# Patient Record
Sex: FEMALE | Race: WHITE | Employment: OTHER | URBAN - METROPOLITAN AREA
[De-identification: names, ages, dates, MRNs, and addresses within clinical notes are randomized per-mention and may not be internally consistent; named-entity substitution may affect disease eponyms.]

---

## 2021-12-09 PROBLEM — I77.9 PERIPHERAL ARTERIAL OCCLUSIVE DISEASE (HCC): Status: ACTIVE | Noted: 2021-12-09

## 2021-12-09 PROBLEM — H26.9 CATARACT, UNSPECIFIED CATARACT TYPE, UNSPECIFIED LATERALITY: Status: ACTIVE | Noted: 2021-12-09

## 2021-12-09 PROBLEM — I10 ESSENTIAL HYPERTENSION: Status: ACTIVE | Noted: 2021-12-09

## 2021-12-16 PROBLEM — E46 PROTEIN-CALORIE MALNUTRITION, UNSPECIFIED SEVERITY (HCC): Status: RESOLVED | Noted: 2021-12-16 | Resolved: 2021-12-16

## 2021-12-16 PROBLEM — E46 PROTEIN-CALORIE MALNUTRITION, UNSPECIFIED SEVERITY (HCC): Status: ACTIVE | Noted: 2021-12-16

## 2022-11-23 ENCOUNTER — APPOINTMENT (OUTPATIENT)
Dept: ULTRASOUND IMAGING | Facility: HOSPITAL | Age: 82
End: 2022-11-23
Attending: EMERGENCY MEDICINE
Payer: MEDICARE

## 2022-11-23 ENCOUNTER — HOSPITAL ENCOUNTER (INPATIENT)
Facility: HOSPITAL | Age: 82
LOS: 2 days | Discharge: HOME | End: 2022-11-25
Attending: EMERGENCY MEDICINE | Admitting: INTERNAL MEDICINE
Payer: MEDICARE

## 2022-11-23 ENCOUNTER — APPOINTMENT (OUTPATIENT)
Dept: CT IMAGING | Facility: HOSPITAL | Age: 82
End: 2022-11-23
Attending: EMERGENCY MEDICINE
Payer: MEDICARE

## 2022-11-23 ENCOUNTER — HOSPITAL ENCOUNTER (OUTPATIENT)
Facility: HOSPITAL | Age: 82
Setting detail: OBSERVATION
Discharge: HOME | End: 2022-11-25
Attending: EMERGENCY MEDICINE | Admitting: INTERNAL MEDICINE
Payer: MEDICARE

## 2022-11-23 ENCOUNTER — APPOINTMENT (OUTPATIENT)
Dept: GENERAL RADIOLOGY | Facility: HOSPITAL | Age: 82
End: 2022-11-23
Attending: RADIOLOGY
Payer: MEDICARE

## 2022-11-23 DIAGNOSIS — J90 PLEURAL EFFUSION: ICD-10-CM

## 2022-11-23 DIAGNOSIS — R06.00 DYSPNEA, UNSPECIFIED TYPE: Primary | ICD-10-CM

## 2022-11-23 DIAGNOSIS — C79.9 METASTATIC ADENOCARCINOMA (HCC): ICD-10-CM

## 2022-11-23 DIAGNOSIS — R22.2 CHEST MASS: ICD-10-CM

## 2022-11-23 PROBLEM — R79.89 AZOTEMIA: Status: ACTIVE | Noted: 2022-11-23

## 2022-11-23 PROBLEM — R73.9 HYPERGLYCEMIA: Status: ACTIVE | Noted: 2022-11-23

## 2022-11-23 LAB
ALBUMIN SERPL-MCNC: 3.1 G/DL (ref 3.4–5)
ALBUMIN/GLOB SERPL: 0.9 {RATIO} (ref 1–2)
ALP LIVER SERPL-CCNC: 99 U/L
ALT SERPL-CCNC: 27 U/L
ANION GAP SERPL CALC-SCNC: 6 MMOL/L (ref 0–18)
AST SERPL-CCNC: 25 U/L (ref 15–37)
ATRIAL RATE: 98 BPM
BASOPHILS # BLD AUTO: 0.09 X10(3) UL (ref 0–0.2)
BASOPHILS NFR BLD AUTO: 0.6 %
BASOPHILS NFR PLR: 0 %
BILIRUB SERPL-MCNC: 0.5 MG/DL (ref 0.1–2)
BUN BLD-MCNC: 27 MG/DL (ref 7–18)
CALCIUM BLD-MCNC: 8.9 MG/DL (ref 8.5–10.1)
CHLORIDE SERPL-SCNC: 104 MMOL/L (ref 98–112)
CO2 SERPL-SCNC: 27 MMOL/L (ref 21–32)
COLOR FLD: YELLOW
CREAT BLD-MCNC: 1 MG/DL
EOSINOPHIL # BLD AUTO: 0.05 X10(3) UL (ref 0–0.7)
EOSINOPHIL NFR BLD AUTO: 0.4 %
EOSINOPHIL NFR PLR: 0 %
ERYTHROCYTE [DISTWIDTH] IN BLOOD BY AUTOMATED COUNT: 15 %
GFR SERPLBLD BASED ON 1.73 SQ M-ARVRAT: 57 ML/MIN/1.73M2 (ref 60–?)
GLOBULIN PLAS-MCNC: 3.4 G/DL (ref 2.8–4.4)
GLUCOSE BLD-MCNC: 150 MG/DL (ref 70–99)
GLUCOSE PLR-MCNC: 38 MG/DL
HCT VFR BLD AUTO: 44.5 %
HGB BLD-MCNC: 14.7 G/DL
IMM GRANULOCYTES # BLD AUTO: 0.09 X10(3) UL (ref 0–1)
IMM GRANULOCYTES NFR BLD: 0.6 %
INR BLD: 1.11 (ref 0.85–1.16)
LDH FLD L TO P-CCNC: 720 U/L
LYMPHOCYTES # BLD AUTO: 0.95 X10(3) UL (ref 1–4)
LYMPHOCYTES NFR BLD AUTO: 6.8 %
LYMPHOCYTES NFR PLR: 54 %
MCH RBC QN AUTO: 28.1 PG (ref 26–34)
MCHC RBC AUTO-ENTMCNC: 33 G/DL (ref 31–37)
MCV RBC AUTO: 85.1 FL
MESOTHL CELL NFR PLR: 4 %
MONOCYTES # BLD AUTO: 1.11 X10(3) UL (ref 0.1–1)
MONOCYTES NFR BLD AUTO: 8 %
MONOS+MACROS NFR PLR: 31 %
NEUTROPHILS # BLD AUTO: 11.59 X10 (3) UL (ref 1.5–7.7)
NEUTROPHILS # BLD AUTO: 11.59 X10(3) UL (ref 1.5–7.7)
NEUTROPHILS NFR BLD AUTO: 83.6 %
NEUTROPHILS NFR PLR: 11 %
OSMOLALITY SERPL CALC.SUM OF ELEC: 292 MOSM/KG (ref 275–295)
P AXIS: 61 DEGREES
P-R INTERVAL: 168 MS
PLATELET # BLD AUTO: 304 10(3)UL (ref 150–450)
POTASSIUM SERPL-SCNC: 3.6 MMOL/L (ref 3.5–5.1)
PROT PLR-MCNC: 3.7 G/DL
PROT SERPL-MCNC: 6.5 G/DL (ref 6.4–8.2)
PROTHROMBIN TIME: 14.3 SECONDS (ref 11.6–14.8)
Q-T INTERVAL: 322 MS
QRS DURATION: 66 MS
QTC CALCULATION (BEZET): 411 MS
R AXIS: 82 DEGREES
RBC # BLD AUTO: 5.23 X10(6)UL
RBC PLEURAL FLUID: <3000 /MM3
SARS-COV-2 RNA RESP QL NAA+PROBE: NOT DETECTED
SODIUM SERPL-SCNC: 137 MMOL/L (ref 136–145)
T AXIS: 81 DEGREES
TOTAL CELLS COUNTED FLD: 100
TROPONIN I HIGH SENSITIVITY: 13 NG/L
VENTRICULAR RATE: 98 BPM
WBC # BLD AUTO: 13.9 X10(3) UL (ref 4–11)
WBC # PLR: 261 /MM3

## 2022-11-23 PROCEDURE — 81275 KRAS GENE VARIANTS EXON 2: CPT | Performed by: INTERNAL MEDICINE

## 2022-11-23 PROCEDURE — 88381 MICRODISSECTION MANUAL: CPT | Performed by: INTERNAL MEDICINE

## 2022-11-23 PROCEDURE — 88108 CYTOPATH CONCENTRATE TECH: CPT | Performed by: INTERNAL MEDICINE

## 2022-11-23 PROCEDURE — 71275 CT ANGIOGRAPHY CHEST: CPT | Performed by: EMERGENCY MEDICINE

## 2022-11-23 PROCEDURE — 99285 EMERGENCY DEPT VISIT HI MDM: CPT

## 2022-11-23 PROCEDURE — 81210 BRAF GENE: CPT | Performed by: INTERNAL MEDICINE

## 2022-11-23 PROCEDURE — 71045 X-RAY EXAM CHEST 1 VIEW: CPT | Performed by: RADIOLOGY

## 2022-11-23 PROCEDURE — 36415 COLL VENOUS BLD VENIPUNCTURE: CPT

## 2022-11-23 PROCEDURE — 87070 CULTURE OTHR SPECIMN AEROBIC: CPT | Performed by: INTERNAL MEDICINE

## 2022-11-23 PROCEDURE — 89050 BODY FLUID CELL COUNT: CPT | Performed by: INTERNAL MEDICINE

## 2022-11-23 PROCEDURE — 84484 ASSAY OF TROPONIN QUANT: CPT | Performed by: EMERGENCY MEDICINE

## 2022-11-23 PROCEDURE — 84484 ASSAY OF TROPONIN QUANT: CPT

## 2022-11-23 PROCEDURE — 87205 SMEAR GRAM STAIN: CPT | Performed by: INTERNAL MEDICINE

## 2022-11-23 PROCEDURE — 88342 IMHCHEM/IMCYTCHM 1ST ANTB: CPT | Performed by: INTERNAL MEDICINE

## 2022-11-23 PROCEDURE — 85610 PROTHROMBIN TIME: CPT | Performed by: INTERNAL MEDICINE

## 2022-11-23 PROCEDURE — 80053 COMPREHEN METABOLIC PANEL: CPT

## 2022-11-23 PROCEDURE — 32555 ASPIRATE PLEURA W/ IMAGING: CPT | Performed by: EMERGENCY MEDICINE

## 2022-11-23 PROCEDURE — 83615 LACTATE (LD) (LDH) ENZYME: CPT | Performed by: INTERNAL MEDICINE

## 2022-11-23 PROCEDURE — 81276 KRAS GENE ADDL VARIANTS: CPT | Performed by: INTERNAL MEDICINE

## 2022-11-23 PROCEDURE — 85025 COMPLETE CBC W/AUTO DIFF WBC: CPT

## 2022-11-23 PROCEDURE — 93010 ELECTROCARDIOGRAM REPORT: CPT

## 2022-11-23 PROCEDURE — 85025 COMPLETE CBC W/AUTO DIFF WBC: CPT | Performed by: EMERGENCY MEDICINE

## 2022-11-23 PROCEDURE — 89051 BODY FLUID CELL COUNT: CPT | Performed by: INTERNAL MEDICINE

## 2022-11-23 PROCEDURE — 88305 TISSUE EXAM BY PATHOLOGIST: CPT | Performed by: INTERNAL MEDICINE

## 2022-11-23 PROCEDURE — 0W9B3ZZ DRAINAGE OF LEFT PLEURAL CAVITY, PERCUTANEOUS APPROACH: ICD-10-PCS | Performed by: RADIOLOGY

## 2022-11-23 PROCEDURE — 88360 TUMOR IMMUNOHISTOCHEM/MANUAL: CPT | Performed by: INTERNAL MEDICINE

## 2022-11-23 PROCEDURE — 81235 EGFR GENE COM VARIANTS: CPT | Performed by: INTERNAL MEDICINE

## 2022-11-23 PROCEDURE — 80053 COMPREHEN METABOLIC PANEL: CPT | Performed by: EMERGENCY MEDICINE

## 2022-11-23 PROCEDURE — 84132 ASSAY OF SERUM POTASSIUM: CPT | Performed by: HOSPITALIST

## 2022-11-23 PROCEDURE — 82945 GLUCOSE OTHER FLUID: CPT | Performed by: INTERNAL MEDICINE

## 2022-11-23 PROCEDURE — 88366 INSITU HYBRIDIZATION (FISH): CPT | Performed by: INTERNAL MEDICINE

## 2022-11-23 PROCEDURE — 88341 IMHCHEM/IMCYTCHM EA ADD ANTB: CPT | Performed by: INTERNAL MEDICINE

## 2022-11-23 PROCEDURE — 93005 ELECTROCARDIOGRAM TRACING: CPT

## 2022-11-23 PROCEDURE — 84157 ASSAY OF PROTEIN OTHER: CPT | Performed by: INTERNAL MEDICINE

## 2022-11-23 RX ORDER — POTASSIUM CHLORIDE 20 MEQ/1
40 TABLET, EXTENDED RELEASE ORAL EVERY 4 HOURS
Status: COMPLETED | OUTPATIENT
Start: 2022-11-23 | End: 2022-11-23

## 2022-11-23 RX ORDER — ACETAMINOPHEN 500 MG
500 TABLET ORAL EVERY 4 HOURS PRN
Status: DISCONTINUED | OUTPATIENT
Start: 2022-11-23 | End: 2022-11-25

## 2022-11-23 RX ORDER — IOHEXOL 350 MG/ML
100 INJECTION, SOLUTION INTRAVENOUS
Status: COMPLETED | OUTPATIENT
Start: 2022-11-23 | End: 2022-11-23

## 2022-11-23 RX ORDER — HEPARIN SODIUM 5000 [USP'U]/ML
5000 INJECTION, SOLUTION INTRAVENOUS; SUBCUTANEOUS EVERY 8 HOURS SCHEDULED
Status: DISCONTINUED | OUTPATIENT
Start: 2022-11-24 | End: 2022-11-25

## 2022-11-23 RX ORDER — DOCUSATE SODIUM 100 MG/1
100 CAPSULE, LIQUID FILLED ORAL 2 TIMES DAILY
Status: DISCONTINUED | OUTPATIENT
Start: 2022-11-23 | End: 2022-11-25

## 2022-11-23 NOTE — ED QUICK NOTES
Orders for admission, patient is aware of plan and ready to go upstairs. Any questions, please call ED RN Lukasz at extension [phone].      Patient Covid vaccination status: Fully vaccinated     COVID Test Ordered in ED: Rapid SARS-CoV-2 by PCR    COVID Suspicion at Admission: Low clinical suspicion for COVID    Running Infusions:  None    Mental Status/LOC at time of transport: A&O x3    Other pertinent information:   CIWA score: N/A   NIH score:  N/A

## 2022-11-23 NOTE — IMAGING NOTE
Pt post thoracentesis, tolerated procedure well. VSS. Pt verbalizes comfort. Pt with clean, dry and intact, small less than nickel size blood noted to dressing to R mid back, no subQ emphysema noted. SBAR given to inpatient RN at [phone]. Post procedure portable cxr done and pt to room 507 with US staff.

## 2022-11-23 NOTE — ED INITIAL ASSESSMENT (HPI)
Pt states she was seen by PCP for SOB and fatigue. Pt had xray done and found mass on lung and fluid. Pt was scheduled to have CT Friday but was told by PCP to come here. Pt denies fevers.
weight-bearing as tolerated

## 2022-11-23 NOTE — PROCEDURES
[facility]  Procedure Note    Miah Steiner Patient Status:  Inpatient    1940 MRN [id number]   [facility] 5NW-A Attending Prabhjot Deng DO   Hosp Day # 0 PCP Ovidio Roger DO     Procedure: US guided left thoracentesis    Pre-Procedure Diagnosis:  Left pleural effusion    Post-Procedure Diagnosis: same    Anesthesia:  Local    Findings:  Serous fluid    Specimens: 1300 ml    Blood Loss:  <5 ml    Tourniquet Time: n/a  Complications:  None  Drains:  removed    Secondary Diagnosis:  none    Ina Ramirez MD  2022

## 2022-11-24 LAB
ALBUMIN SERPL-MCNC: 2.8 G/DL (ref 3.4–5)
ALBUMIN/GLOB SERPL: 1 {RATIO} (ref 1–2)
ALP LIVER SERPL-CCNC: 95 U/L
ALT SERPL-CCNC: 23 U/L
ANION GAP SERPL CALC-SCNC: 4 MMOL/L (ref 0–18)
AST SERPL-CCNC: 20 U/L (ref 15–37)
BILIRUB SERPL-MCNC: 0.5 MG/DL (ref 0.1–2)
BUN BLD-MCNC: 23 MG/DL (ref 7–18)
CALCIUM BLD-MCNC: 8.8 MG/DL (ref 8.5–10.1)
CHLORIDE SERPL-SCNC: 109 MMOL/L (ref 98–112)
CO2 SERPL-SCNC: 28 MMOL/L (ref 21–32)
CREAT BLD-MCNC: 0.67 MG/DL
ERYTHROCYTE [DISTWIDTH] IN BLOOD BY AUTOMATED COUNT: 15.2 %
GFR SERPLBLD BASED ON 1.73 SQ M-ARVRAT: 88 ML/MIN/1.73M2 (ref 60–?)
GLOBULIN PLAS-MCNC: 2.7 G/DL (ref 2.8–4.4)
GLUCOSE BLD-MCNC: 92 MG/DL (ref 70–99)
HCT VFR BLD AUTO: 44.1 %
HGB BLD-MCNC: 14.3 G/DL
MAGNESIUM SERPL-MCNC: 2.4 MG/DL (ref 1.6–2.6)
MCH RBC QN AUTO: 27.6 PG (ref 26–34)
MCHC RBC AUTO-ENTMCNC: 32.4 G/DL (ref 31–37)
MCV RBC AUTO: 85.1 FL
OSMOLALITY SERPL CALC.SUM OF ELEC: 295 MOSM/KG (ref 275–295)
PLATELET # BLD AUTO: 298 10(3)UL (ref 150–450)
POTASSIUM SERPL-SCNC: 5 MMOL/L (ref 3.5–5.1)
POTASSIUM SERPL-SCNC: 5.3 MMOL/L (ref 3.5–5.1)
POTASSIUM SERPL-SCNC: 5.3 MMOL/L (ref 3.5–5.1)
PROT SERPL-MCNC: 5.5 G/DL (ref 6.4–8.2)
RBC # BLD AUTO: 5.18 X10(6)UL
SODIUM SERPL-SCNC: 141 MMOL/L (ref 136–145)
WBC # BLD AUTO: 12.1 X10(3) UL (ref 4–11)

## 2022-11-24 PROCEDURE — 80053 COMPREHEN METABOLIC PANEL: CPT | Performed by: HOSPITALIST

## 2022-11-24 PROCEDURE — 93010 ELECTROCARDIOGRAM REPORT: CPT | Performed by: INTERNAL MEDICINE

## 2022-11-24 PROCEDURE — 84132 ASSAY OF SERUM POTASSIUM: CPT | Performed by: HOSPITALIST

## 2022-11-24 PROCEDURE — 83735 ASSAY OF MAGNESIUM: CPT | Performed by: HOSPITALIST

## 2022-11-24 PROCEDURE — 85027 COMPLETE CBC AUTOMATED: CPT | Performed by: HOSPITALIST

## 2022-11-24 RX ORDER — POLYETHYLENE GLYCOL 3350 17 G/17G
17 POWDER, FOR SOLUTION ORAL DAILY
Status: DISCONTINUED | OUTPATIENT
Start: 2022-11-24 | End: 2022-11-25

## 2022-11-24 NOTE — PLAN OF CARE
Problem: Left pleural effusion   Data: Ax04. Telemetry-sinus rhythm. , room air, 02 sats 92%<. Afebrile. Diminished breathe sounds. Continent, constipated colace given. Saline lock. Up with 1 assist walker. Regular diet. Thoracentesis site clean and dry. Potassium 5.3 , MD clifford, EKG drawn. NSR- HR 74.  Repeat K+  Intervention: Potassium, heparin   Education: Medications, call light   Response: Cooperative with care     Problem: Patient/Family Goals  Goal: Patient/Family Long Term Goal  Description: Patient's Long Term Goal: Discharge to home     Interventions:  - Follow plan of care  - See additional Care Plan goals for specific interventions  Outcome: Progressing  Goal: Patient/Family Short Term Goal  Description: Patient's Short Term Goal:   11/23 noc: Have BM     Interventions:   - Stool softener  - Consults   - See additional Care Plan goals for specific interventions  Outcome: Not Progressing     Problem: RESPIRATORY - ADULT  Goal: Achieves optimal ventilation and oxygenation  Description: INTERVENTIONS:  - Assess for changes in respiratory status  - Assess for changes in mentation and behavior  - Position to facilitate oxygenation and minimize respiratory effort  - Oxygen supplementation based on oxygen saturation or ABGs  - Provide Smoking Cessation handout, if applicable  - Encourage broncho-pulmonary hygiene including cough, deep breathe, Incentive Spirometry  - Assess the need for suctioning and perform as needed  - Assess and instruct to report SOB or any respiratory difficulty  - Respiratory Therapy support as indicated  - Manage/alleviate anxiety  - Monitor for signs/symptoms of CO2 retention  Outcome: Progressing     Problem: Patient/Family Goals  Goal: Patient/Family Long Term Goal  Description: Patient's Long Term Goal: Discharge to home     Interventions:  - Follow plan of care  - See additional Care Plan goals for specific interventions  Outcome: Progressing  Goal: Patient/Family Short Term Goal  Description: Patient's Short Term Goal:   11/23 noc: Have BM     Interventions:   - Stool softener  - Consults   - See additional Care Plan goals for specific interventions  Outcome: Not Progressing

## 2022-11-24 NOTE — PLAN OF CARE
Patient is A&Ox4. Vital signs wnl, afebrile. Weaned to RA, maintaining sats >92%. Saturations dropped to 88% the lowest during ambulation on RA, sats recovered immediately. Per patient, feeling more SOB and breathing is labored after ambulation. Hospitalist notified. Per patient, having productive cough. Tele, NSR. No pain, n/v/d. Voids. High fall risk, up with standby. Fall precautions in place. Heparin subq for VTE prophylaxis. Encouraged to ambulate in the hallway and use IS hourly. Patient updated on plan of care, needs and concerns addressed.     Problem: Patient/Family Goals  Goal: Patient/Family Long Term Goal  Description: Patient's Long Term Goal: Discharge to home     Interventions:  - Follow plan of care  - See additional Care Plan goals for specific interventions  Outcome: Progressing  Goal: Patient/Family Short Term Goal  Description: Patient's Short Term Goal:   11/23 noc: Have BM  11/24 am: have a bm     Interventions:   - Stool softener  - Consults   - See additional Care Plan goals for specific interventions  Outcome: Progressing     Problem: RESPIRATORY - ADULT  Goal: Achieves optimal ventilation and oxygenation  Description: INTERVENTIONS:  - Assess for changes in respiratory status  - Assess for changes in mentation and behavior  - Position to facilitate oxygenation and minimize respiratory effort  - Oxygen supplementation based on oxygen saturation or ABGs  - Provide Smoking Cessation handout, if applicable  - Encourage broncho-pulmonary hygiene including cough, deep breathe, Incentive Spirometry  - Assess the need for suctioning and perform as needed  - Assess and instruct to report SOB or any respiratory difficulty  - Respiratory Therapy support as indicated  - Manage/alleviate anxiety  - Monitor for signs/symptoms of CO2 retention  Outcome: Progressing

## 2022-11-25 VITALS
DIASTOLIC BLOOD PRESSURE: 82 MMHG | BODY MASS INDEX: 12.86 KG/M2 | SYSTOLIC BLOOD PRESSURE: 141 MMHG | WEIGHT: 80 LBS | TEMPERATURE: 98 F | RESPIRATION RATE: 22 BRPM | OXYGEN SATURATION: 95 % | HEART RATE: 104 BPM | HEIGHT: 66 IN

## 2022-11-25 LAB
ANION GAP SERPL CALC-SCNC: 5 MMOL/L (ref 0–18)
ATRIAL RATE: 104 BPM
ATRIAL RATE: 74 BPM
BUN BLD-MCNC: 19 MG/DL (ref 7–18)
CALCIUM BLD-MCNC: 9.1 MG/DL (ref 8.5–10.1)
CHLORIDE SERPL-SCNC: 107 MMOL/L (ref 98–112)
CO2 SERPL-SCNC: 27 MMOL/L (ref 21–32)
CREAT BLD-MCNC: 0.64 MG/DL
ERYTHROCYTE [DISTWIDTH] IN BLOOD BY AUTOMATED COUNT: 15.2 %
GFR SERPLBLD BASED ON 1.73 SQ M-ARVRAT: 89 ML/MIN/1.73M2 (ref 60–?)
GLUCOSE BLD-MCNC: 107 MG/DL (ref 70–99)
HCT VFR BLD AUTO: 45.5 %
HGB BLD-MCNC: 15.1 G/DL
MCH RBC QN AUTO: 28.3 PG (ref 26–34)
MCHC RBC AUTO-ENTMCNC: 33.2 G/DL (ref 31–37)
MCV RBC AUTO: 85.2 FL
OSMOLALITY SERPL CALC.SUM OF ELEC: 291 MOSM/KG (ref 275–295)
P AXIS: 46 DEGREES
P AXIS: 67 DEGREES
P-R INTERVAL: 150 MS
P-R INTERVAL: 156 MS
PLATELET # BLD AUTO: 329 10(3)UL (ref 150–450)
POTASSIUM SERPL-SCNC: 4.2 MMOL/L (ref 3.5–5.1)
Q-T INTERVAL: 356 MS
Q-T INTERVAL: 408 MS
QRS DURATION: 84 MS
QRS DURATION: 86 MS
QTC CALCULATION (BEZET): 452 MS
QTC CALCULATION (BEZET): 468 MS
R AXIS: 85 DEGREES
R AXIS: 87 DEGREES
RBC # BLD AUTO: 5.34 X10(6)UL
SODIUM SERPL-SCNC: 139 MMOL/L (ref 136–145)
T AXIS: 58 DEGREES
T AXIS: 86 DEGREES
T4 FREE SERPL-MCNC: 1.6 NG/DL (ref 0.8–1.7)
TSI SER-ACNC: 1.12 MIU/ML (ref 0.36–3.74)
VENTRICULAR RATE: 104 BPM
VENTRICULAR RATE: 74 BPM
WBC # BLD AUTO: 10.1 X10(3) UL (ref 4–11)

## 2022-11-25 PROCEDURE — 93005 ELECTROCARDIOGRAM TRACING: CPT

## 2022-11-25 PROCEDURE — 84439 ASSAY OF FREE THYROXINE: CPT | Performed by: HOSPITALIST

## 2022-11-25 PROCEDURE — 93010 ELECTROCARDIOGRAM REPORT: CPT | Performed by: INTERNAL MEDICINE

## 2022-11-25 PROCEDURE — 85027 COMPLETE CBC AUTOMATED: CPT | Performed by: HOSPITALIST

## 2022-11-25 PROCEDURE — 80048 BASIC METABOLIC PNL TOTAL CA: CPT | Performed by: HOSPITALIST

## 2022-11-25 PROCEDURE — 84443 ASSAY THYROID STIM HORMONE: CPT | Performed by: HOSPITALIST

## 2022-11-25 RX ORDER — HYDRALAZINE HYDROCHLORIDE 20 MG/ML
10 INJECTION INTRAMUSCULAR; INTRAVENOUS EVERY 6 HOURS PRN
Status: DISCONTINUED | OUTPATIENT
Start: 2022-11-25 | End: 2022-11-25

## 2022-11-25 NOTE — PROGRESS NOTES
11/25/22 0525   Provider Notification   Reason for Communication Evaluate  (elevated BP)   Provider Name Other (comment)  (Rad Hallman)   Method of Communication Page   Response Waiting for response   Notification Time [phone]     FYI, pt /92 this am. Pt has hx of HTN but does not take anything daily for it. Pt BP was elevated during the day yesterday but went down on its own. This am SBP went from 160s @ midnight to SBP in 170s at 0500. Per MD, hydralazine 10mg ordered PRN q6h as needed. WCTM.

## 2022-11-25 NOTE — PLAN OF CARE
Pt A&Ox4. Forgetful at times. Glasses at bedside. Room air, . C/o of sob with activity. Tele, NSR/ST with activity. Heparin. Voids, up SBA. C/o of constipation, scheduled/prn stool softeners given. No c/o of pain, n/v/d. Regular diet, tolerating well. Pt updated on poc. No further needs at this time. Safety/fall precautions in place. TM. 0550: Pt ambulated to BR with this RN. Pt HR jumped to 130s while in BR. Pt c/o of palpitations and sob just like she experienced yesterday. HR dropped down to 90s-low 100s after laying back in bed & palpitations/sob resolved. WCTM.     Problem: Patient/Family Goals  Goal: Patient/Family Long Term Goal  Description: Patient's Long Term Goal: Discharge to home     Interventions:  - Follow plan of care  - See additional Care Plan goals for specific interventions  Outcome: Progressing  Goal: Patient/Family Short Term Goal  Description: Patient's Short Term Goal:   11/23 noc: Have BM  11/24 am: have a bm   11/24 NOC: have BM, relieve sob with ambulation    Interventions:   - Stool softener  - Consults   - See additional Care Plan goals for specific interventions  Outcome: Progressing     Problem: RESPIRATORY - ADULT  Goal: Achieves optimal ventilation and oxygenation  Description: INTERVENTIONS:  - Assess for changes in respiratory status  - Assess for changes in mentation and behavior  - Position to facilitate oxygenation and minimize respiratory effort  - Oxygen supplementation based on oxygen saturation or ABGs  - Provide Smoking Cessation handout, if applicable  - Encourage broncho-pulmonary hygiene including cough, deep breathe, Incentive Spirometry  - Assess the need for suctioning and perform as needed  - Assess and instruct to report SOB or any respiratory difficulty  - Respiratory Therapy support as indicated  - Manage/alleviate anxiety  - Monitor for signs/symptoms of CO2 retention  Outcome: Progressing

## 2022-11-25 NOTE — PROGRESS NOTES
NURSING DISCHARGE NOTE    Discharged Home via Wheelchair. Accompanied by Support staff  Belongings Taken by patient/family. PIV removed, discharge education completed with son at bedside. No questions on POC at this time.

## 2022-11-25 NOTE — CDS QUERY
Kareen Malagon     Dear Dr. Marland Hamman,   Clinical information (provided below) indicates a diagnosis of ACUTE RESPIRATORY FAILURE. For accurate ICD-10-CM code assignment to reflect severity of illness and risk of mortality,   BASED ON YOUR CLINICAL JUDGMENT, PLEASE CLARIFY  THE DOCUMENTED DIAGNOSIS OF: ACUTE RESPIRATORY FAILURE    [   ] Acute hypoxic respiratory failure is ruled out after study    [ x  ] Acute hypoxic respiratory failure is ruled in (please document any additional clinical indicators supportive of your diagnosis) hypoxic and requing oxygen support due to pleural effusion. [   ] Other (please specify): _________________________    [  ] Unable to determine    ________________________________________________________________________________     Documentation from the Medical Record:   Risk: mass to L lung found on xray and pleural effusion. Hx smoker   Clinical indicators:H&P- SOB for months , mostly exertional,worsening overtime, occasional chest pain with cough. Decreased lung sounds to left , normal effort. RR 20. SpO2 95%. Arrived on room air. On O2 2-3 L NC 11/-2043. RR 20-24 in ER  Impression: acute hypoxic RF due to above(L pleural effusion)   Pulmonology consult note: assessment and plan: Abnormal chest imaging - left lung collapse with large left pleural effusion. Findings are concerning for lung cancer  - risks, benefits and alternatives to left diagnostic and therapeutic thoracentesis discussed with patient and she agreed with proceeding. Exam- no distress. ecreased breath sound on the left , clear on the right . 11/24 NN sats dropped 88% during ambulation on RA. -recovered immediately. breathing more labored after ambulation. Treatment- Thoracentesis, Pulmonary consult. O2 2-3L NC 11/-2043 .     For questions regarding this query, please contact Clinical Documentation :  Maricel Briseno RN, BSN Ext [address] [email or web address].                                                                                      THIS FORM IS A PERMANENT PART OF THE MEDICAL RECORD

## 2022-11-25 NOTE — DISCHARGE INSTRUCTIONS
Follow up with pulmonary (Dr Debbie Skinner) and PCP next week. Your fluid studies are still pending at time of discharge. Follow up next week with pulmonary to discuss final results.

## 2022-11-25 NOTE — PROGRESS NOTES
11/24/22 1400   Mobility   O2 walk?  Yes   SPO2% on Room Air at Rest 94   SPO2% Ambulation on Room Air 88

## 2022-11-25 NOTE — DISCHARGE SUMMARY
[facility] SPECIALTY [place] Hospitalist Discharge Summary    Patient ID  Kirstin Farrell  [id number]  [de-identified]year old  1/23/1940    Admit date: 11/23/2022    Discharge date: 11/25/22    Attending: Kiki Smyth DO     Primary Care Physician: Belia Salas DO      Reason for admission: sob, hypoxia    Discharge condition: stable    Disposition: home    Important follow up:  -PCP within 7 d  -specialists: Pulm 1 week       Additional patient instructions     Follow up with pulmonary (Dr Jesus Márquez) and PCP next week. Your fluid studies are still pending at time of discharge. Follow up next week with pulmonary to discuss final results. Discharge med list     Medication List      STOP taking these medications    bisoprolol-hydrochlorothiazide 10-6.25 MG Tabs  Commonly known as: Ziac     bisoprolol-hydrochlorothiazide 5-6.25 MG Tabs  Commonly known as: Ziac     Naproxen Sodium 220 MG Caps            Discharge Diagnoses:    -exertional sob due to large L pleural effusion sp thoracentesis  -acute hypoxic RF due to above - resolved  -htn  -long term smoker, quit recently  -protein calorie malnutrition       Consults:  IP CONSULT TO PULMONOLOGY    Radiology:  CT ANGIOGRAPHY, CHEST (CPT=71275)    Result Date: 11/23/2022  PROCEDURE:  CT ANGIOGRAPHY, CHEST (FCQ=06068)  LOCATION:  [facility]   COMPARISON:  None. INDICATIONS:  worsen giles, here for admit thru the er- worsening mass  TECHNIQUE:  IV contrast-enhanced multislice CT angiography is performed through the pulmonary arterial anatomy. 3D volume renderings are generated. Dose reduction techniques were used. Dose information is transmitted to the ACR [facility] [employer] [facility] [employer]iology) NRDR ([address]) which includes the Dose Index Registry. PATIENT STATED HISTORY:(As transcribed by Technologist)  Difficulty breathing for a few weeks now   CONTRAST USED:  100cc of Omnipaque 350  FINDINGS:  VASCULATURE:  No visible pulmonary arterial thrombus or attenuation.   THORACIC AORTA:  No aneurysm or visible dissection. LUNGS:  Left lung collapse with little aeration involving the left upper lung. Interstitial nodularity within the right upper lobe. MEDIASTINUM:  No adenopathy or mass. ATILIO:  No mass or adenopathy. CARDIAC:  No enlargement, pericardial thickening, or significant calcification. PLEURA:  Large left pleural effusion. CHEST WALL:  No mass or axillary adenopathy. LIMITED ABDOMEN:  Limited images of the upper abdomen are unremarkable. BONES:  No bony lesion or fracture. CONCLUSION:  1. No CT evidence for pulmonary embolism. 2. Large left pleural effusion with lung collapse and little aeration of the left lung with rightward mediastinal shift. If patient has prior studies, these would be helpful for further evaluation and to assess stability of the above findings. Dictated by (CST): Dee García MD on 11/23/2022 at 2:14 PM     Finalized by (CST): Dee García MD on 11/23/2022 at 2:21 PM       [address] (CPT=32555)    Result Date: 11/23/2022  PROCEDURE:  US THORACENTESIS GUIDED LEFT (CPT=32555)  LOCATION:  [facility]  COMPARISON:  [facility] , CT, CT ANGIOGRAPHY, CHEST (CPT=71275), 11/23/2022, 1:39 PM.  [facility] , XR, XR CHEST AP PORTABLE  (CPT=71045), 11/23/2022, 5:30 PM.  INDICATIONS:  worsen giles, here for admit thru the er  DESCRIPTION OF PROCEDURE:  Informed consent was obtained. Time out was performed by the staff. The patient was prepped and draped in the standard sterile fashion. An ultrasound-guided thoracentesis was performed in the usual sterile manner. PATIENT STATED HISTORY: (As transcribed by Technologist)  Chest fluid. NEEDLE SPECIFICATIONS:  6 Greenlandic Safe-T-Centesis catheter over a needle FLUID LOCATION:  Left hemithorax FLUID REMOVED:  1300 mL of serous fluid MEDICATION:  1% lidocaine COMPLICATIONS:  None. LABORATORY:  Fluid sent for analysis as ordered.             CONCLUSION:  Ultrasound-guided left thoracentesis of 1300 mL was performed without complication. Dictated by (CST): Leanne Malone MD on 11/23/2022 at 6:29 PM     Finalized by (CST): Leanne Malone MD on 11/23/2022 at 6:30 PM       XR CHEST AP PORTABLE  (CPT=71045)    Result Date: 11/23/2022  PROCEDURE:  XR CHEST AP PORTABLE  (CPT=71045)  LOCATION:                                       TECHNIQUE:  AP chest radiograph was obtained. COMPARISON:  [facility] , CT, CT ANGIOGRAPHY, CHEST (CPT=71275), 11/23/2022, 1:39 PM.  INDICATIONS:  Post Thoracentesis  PATIENT STATED HISTORY: (As transcribed by Technologist)  Patient offered no additional history at this time. FINDINGS:  Interval left thoracentesis. There is no significant pneumothorax identified. Persistent medium-sized left pleural effusion. There is diffuse interstitial and airspace disease in the left lung with atelectasis/consolidation in the lower left lung with underlying pneumonia, neoplasm, or other etiologies not excluded. Clinical correlation recommended. Degenerative changes the spine with rightward curvature. The cardiomediastinal silhouette is obscured along the left side. CONCLUSION:  Interval left thoracentesis. There is no significant pneumothorax identified. Persistent medium-sized left pleural effusion. There is diffuse interstitial and airspace disease in the left lung with atelectasis/consolidation in the lower left lung with underlying pneumonia, neoplasm, or other etiologies not excluded. Clinical correlation recommended. Dictated by (CST): Ibis Trinh MD on 11/23/2022 at 6:09 PM     Finalized by (CST): Ibis Trinh MD on 11/23/2022 at 6:12 PM         Operative reports:      Hospital course:    Patient is a [de-identified]year old female with PMH sig for long term smoker, htn, here for sob  Has been feeling sob for months, mostly exertional, worsening over time. Has had cough previously, clear, now rare. Has had occasional chest pain with cough. No fevers / chills.  No n/v  No abd pain   No leg swelling  Has been noted to have L lung mass and pleural effusion but has not been able to get further work up  In ER CT with extensive left sided pleural effusion  Has had wt loss - one year ago was 96 lbs, now 80 lbs  =-=-=-=-=-=-=-=-=-=-=-=-=-=-    She was found to have large L pleural effusion sp thoracentesis with imporvement of her symptoms  Noted to be tachycardic as well on day of dc but she states he is very anxous of being here  Ct was neg for pe  tsh was checked and noraml  No arrhythmia on tele    Asked to fu with PCP and pulm next week    Day of discharge exam:   11/25/22  1000   BP: 119/69   Pulse: 112   Resp: 20   Temp:      Feels anxious  No nv  No cp    No acute distress, alert and oriented    Lungs clear  Heart slightly tachycardic  Abdomen benign    Total time coordinating care 32 min      Patient and/or family had opportunity to ask questions and expressed understanding and agreement with therapeutic plan as outlined         [address] [facility]  [phone]  Answering Service: [phone]

## 2022-11-25 NOTE — CDS QUERY
Conflicting Diagnoses  Jonas Blanchard  Dear Dr. Roberto Cardenas,  Clinical information (provided below) documents conflicting diagnoses. For accurate ICD-10-CM code assignment , please choose the clarification below as appropriate:                       [  ]    No Malnutrition                     [ x ]    Protein calorie malnutrition is possible --- I don't know what the exact criteria for malnutrition is, but I do suspect a component of malnutrition. Her weight was 115 lb in 2020 (documented in outpatient charts). Now she is 80lbs. BMI of 12 and 35 lb weight loss I think it's significant. Per RD notes \"Unable to complete full NFPE\" so I don't think that excludes malnutrition as a possibility. Documentation from the Medical Record:   Risk: hx lung mass, smoker   Clinical indicators-Notes weight loss 16 # in a year . BMI 12. Frail appearing. Cachectic. MST score on admit -2  Dietician consultation: Pt does not meet malnutrition criteria. She reports fair to poor appetite in hospital, but normally eats \"fine\" per pt. She states she usually eats 2 meals per day, and son is very involved in making sure she's eating. She recently starting drinking protein shakes as well and enjoys these. She is unable to tell me her height or her weight. She denies any changes to her clothes fitting and reports she has been thin her entire life. She is no obvious orbital or temporal wasting noted. Unable to complete full NFPE due to comfort level and pt is dressed. Wt hx indicates she has lost 16% body wt x 11 mo (not significant per ASPEN standards) Encouraged adequate intake and use of ONS. Discharge planned this date. Treatment;    NUTRITION INTERVENTION:     Meal and Snacks - monitor patient po intake. Encourage adequate po of appropriate diet. Medical Food Supplements - Ensure Enlive Daily.  Consider increasing to BID pending wt changes   Nutrition Education - Encouraged eating at least 3x per day with use of ONS as needed. Pt/family receptive to education, no barriers noted.    Vitamin and Mineral Supplements - adding Multivitamin with minerals     For questions regarding this query, please contact Clinical : Sirisha Mart RN CCDS x [phone]                                                                                   THIS FORM IS A PERMANENT PART OF THE MEDICAL RECORD

## 2022-11-25 NOTE — PLAN OF CARE
Problem: Patient/Family Goals  Goal: Patient/Family Long Term Goal  Description: Patient's Long Term Goal: Discharge to home     Interventions:  - Follow plan of care  - See additional Care Plan goals for specific interventions  Outcome: Progressing  Goal: Patient/Family Short Term Goal  Description: Patient's Short Term Goal:   11/23 noc: Have BM  11/24 am: have a bm   11/24 NOC: have BM, relieve sob with ambulation  11/25am: go home     Interventions:   - Stool softener  - Consults   - See additional Care Plan goals for specific interventions  Outcome: Progressing     Problem: RESPIRATORY - ADULT  Goal: Achieves optimal ventilation and oxygenation  Description: INTERVENTIONS:  - Assess for changes in respiratory status  - Assess for changes in mentation and behavior  - Position to facilitate oxygenation and minimize respiratory effort  - Oxygen supplementation based on oxygen saturation or ABGs  - Provide Smoking Cessation handout, if applicable  - Encourage broncho-pulmonary hygiene including cough, deep breathe, Incentive Spirometry  - Assess the need for suctioning and perform as needed  - Assess and instruct to report SOB or any respiratory difficulty  - Respiratory Therapy support as indicated  - Manage/alleviate anxiety  - Monitor for signs/symptoms of CO2 retention  Outcome: Progressing

## 2022-12-02 LAB
ADEQUACY OF SPECIMEN: ADEQUATE
ROS1 BY IHC RESULT: NEGATIVE

## 2022-12-03 LAB — BRAF CODON 600 MUTATION DETECT: NOT DETECTED

## 2022-12-05 LAB
KRAS MUTATION DETECTION: POSITIVE
RET FISH RESULT: NEGATIVE
TOTAL CELL COUNT: 100

## 2022-12-07 ENCOUNTER — APPOINTMENT (OUTPATIENT)
Dept: ULTRASOUND IMAGING | Facility: HOSPITAL | Age: 82
End: 2022-12-07
Attending: STUDENT IN AN ORGANIZED HEALTH CARE EDUCATION/TRAINING PROGRAM
Payer: MEDICARE

## 2022-12-07 ENCOUNTER — HOSPITAL ENCOUNTER (INPATIENT)
Facility: HOSPITAL | Age: 82
LOS: 2 days | Discharge: HOSPICE | End: 2022-12-09
Attending: STUDENT IN AN ORGANIZED HEALTH CARE EDUCATION/TRAINING PROGRAM | Admitting: INTERNAL MEDICINE
Payer: MEDICARE

## 2022-12-07 ENCOUNTER — APPOINTMENT (OUTPATIENT)
Dept: GENERAL RADIOLOGY | Facility: HOSPITAL | Age: 82
End: 2022-12-07
Attending: STUDENT IN AN ORGANIZED HEALTH CARE EDUCATION/TRAINING PROGRAM
Payer: MEDICARE

## 2022-12-07 DIAGNOSIS — J90 RECURRENT LEFT PLEURAL EFFUSION: ICD-10-CM

## 2022-12-07 DIAGNOSIS — R09.02 HYPOXIA: Primary | ICD-10-CM

## 2022-12-07 DIAGNOSIS — R06.00 DYSPNEA, UNSPECIFIED TYPE: ICD-10-CM

## 2022-12-07 DIAGNOSIS — C34.90 MALIGNANT NEOPLASM OF LUNG, UNSPECIFIED LATERALITY, UNSPECIFIED PART OF LUNG (HCC): ICD-10-CM

## 2022-12-07 DIAGNOSIS — J18.9 NOSOCOMIAL PNEUMONIA: ICD-10-CM
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
## 2022-12-07 LAB
ALBUMIN SERPL-MCNC: 3.1 G/DL (ref 3.4–5)
ALBUMIN/GLOB SERPL: 0.8 {RATIO} (ref 1–2)
ALP LIVER SERPL-CCNC: 114 U/L
ALT SERPL-CCNC: 36 U/L
ANION GAP SERPL CALC-SCNC: 6 MMOL/L (ref 0–18)
APTT PPP: 24.6 SECONDS (ref 23.3–35.6)
AST SERPL-CCNC: 48 U/L (ref 15–37)
BASOPHILS # BLD AUTO: 0.1 X10(3) UL (ref 0–0.2)
BASOPHILS NFR BLD AUTO: 0.7 %
BILIRUB SERPL-MCNC: 0.6 MG/DL (ref 0.1–2)
BUN BLD-MCNC: 32 MG/DL (ref 7–18)
CALCIUM BLD-MCNC: 9.3 MG/DL (ref 8.5–10.1)
CHLORIDE SERPL-SCNC: 106 MMOL/L (ref 98–112)
CO2 SERPL-SCNC: 27 MMOL/L (ref 21–32)
CREAT BLD-MCNC: 0.85 MG/DL
EGFR BY PYROSEQUENCING RESULT: NOT DETECTED
EOSINOPHIL # BLD AUTO: 0.09 X10(3) UL (ref 0–0.7)
EOSINOPHIL NFR BLD AUTO: 0.6 %
ERYTHROCYTE [DISTWIDTH] IN BLOOD BY AUTOMATED COUNT: 15.4 %
GFR SERPLBLD BASED ON 1.73 SQ M-ARVRAT: 69 ML/MIN/1.73M2 (ref 60–?)
GLOBULIN PLAS-MCNC: 3.7 G/DL (ref 2.8–4.4)
GLUCOSE BLD-MCNC: 118 MG/DL (ref 70–99)
HCT VFR BLD AUTO: 46.1 %
HGB BLD-MCNC: 15.3 G/DL
IMM GRANULOCYTES # BLD AUTO: 0.09 X10(3) UL (ref 0–1)
IMM GRANULOCYTES NFR BLD: 0.6 %
INR BLD: 1.13 (ref 0.85–1.16)
LACTATE SERPL-SCNC: 1.4 MMOL/L (ref 0.4–2)
LIPASE SERPL-CCNC: 147 U/L (ref 73–393)
LYMPHOCYTES # BLD AUTO: 0.97 X10(3) UL (ref 1–4)
LYMPHOCYTES NFR BLD AUTO: 6.7 %
MCH RBC QN AUTO: 28.4 PG (ref 26–34)
MCHC RBC AUTO-ENTMCNC: 33.2 G/DL (ref 31–37)
MCV RBC AUTO: 85.5 FL
MONOCYTES # BLD AUTO: 1.14 X10(3) UL (ref 0.1–1)
MONOCYTES NFR BLD AUTO: 7.9 %
NEUTROPHILS # BLD AUTO: 12 X10 (3) UL (ref 1.5–7.7)
NEUTROPHILS # BLD AUTO: 12 X10(3) UL (ref 1.5–7.7)
NEUTROPHILS NFR BLD AUTO: 83.5 %
OSMOLALITY SERPL CALC.SUM OF ELEC: 296 MOSM/KG (ref 275–295)
PLATELET # BLD AUTO: 316 10(3)UL (ref 150–450)
POTASSIUM SERPL-SCNC: 4.6 MMOL/L (ref 3.5–5.1)
PROCALCITONIN SERPL-MCNC: 7.82 NG/ML (ref ?–0.16)
PROT SERPL-MCNC: 6.8 G/DL (ref 6.4–8.2)
PROTHROMBIN TIME: 14.5 SECONDS (ref 11.6–14.8)
RBC # BLD AUTO: 5.39 X10(6)UL
SARS-COV-2 RNA RESP QL NAA+PROBE: NOT DETECTED
SODIUM SERPL-SCNC: 139 MMOL/L (ref 136–145)
WBC # BLD AUTO: 14.4 X10(3) UL (ref 4–11)

## 2022-12-07 PROCEDURE — 83605 ASSAY OF LACTIC ACID: CPT | Performed by: STUDENT IN AN ORGANIZED HEALTH CARE EDUCATION/TRAINING PROGRAM

## 2022-12-07 PROCEDURE — 99292 CRITICAL CARE ADDL 30 MIN: CPT

## 2022-12-07 PROCEDURE — 71045 X-RAY EXAM CHEST 1 VIEW: CPT | Performed by: STUDENT IN AN ORGANIZED HEALTH CARE EDUCATION/TRAINING PROGRAM

## 2022-12-07 PROCEDURE — 36415 COLL VENOUS BLD VENIPUNCTURE: CPT

## 2022-12-07 PROCEDURE — 85730 THROMBOPLASTIN TIME PARTIAL: CPT | Performed by: STUDENT IN AN ORGANIZED HEALTH CARE EDUCATION/TRAINING PROGRAM

## 2022-12-07 PROCEDURE — 96375 TX/PRO/DX INJ NEW DRUG ADDON: CPT

## 2022-12-07 PROCEDURE — 83690 ASSAY OF LIPASE: CPT | Performed by: STUDENT IN AN ORGANIZED HEALTH CARE EDUCATION/TRAINING PROGRAM

## 2022-12-07 PROCEDURE — 84145 PROCALCITONIN (PCT): CPT | Performed by: INTERNAL MEDICINE

## 2022-12-07 PROCEDURE — 99285 EMERGENCY DEPT VISIT HI MDM: CPT

## 2022-12-07 PROCEDURE — 96361 HYDRATE IV INFUSION ADD-ON: CPT

## 2022-12-07 PROCEDURE — 85025 COMPLETE CBC W/AUTO DIFF WBC: CPT | Performed by: STUDENT IN AN ORGANIZED HEALTH CARE EDUCATION/TRAINING PROGRAM

## 2022-12-07 PROCEDURE — 80053 COMPREHEN METABOLIC PANEL: CPT | Performed by: STUDENT IN AN ORGANIZED HEALTH CARE EDUCATION/TRAINING PROGRAM

## 2022-12-07 PROCEDURE — 94640 AIRWAY INHALATION TREATMENT: CPT

## 2022-12-07 PROCEDURE — 93971 EXTREMITY STUDY: CPT | Performed by: STUDENT IN AN ORGANIZED HEALTH CARE EDUCATION/TRAINING PROGRAM

## 2022-12-07 PROCEDURE — 85610 PROTHROMBIN TIME: CPT | Performed by: STUDENT IN AN ORGANIZED HEALTH CARE EDUCATION/TRAINING PROGRAM

## 2022-12-07 PROCEDURE — 99291 CRITICAL CARE FIRST HOUR: CPT

## 2022-12-07 PROCEDURE — 96365 THER/PROPH/DIAG IV INF INIT: CPT

## 2022-12-07 PROCEDURE — 87040 BLOOD CULTURE FOR BACTERIA: CPT | Performed by: STUDENT IN AN ORGANIZED HEALTH CARE EDUCATION/TRAINING PROGRAM

## 2022-12-07 RX ORDER — HYDRALAZINE HYDROCHLORIDE 20 MG/ML
10 INJECTION INTRAMUSCULAR; INTRAVENOUS EVERY 6 HOURS PRN
Status: DISCONTINUED | OUTPATIENT
Start: 2022-12-07 | End: 2022-12-09

## 2022-12-07 RX ORDER — IPRATROPIUM BROMIDE AND ALBUTEROL SULFATE 2.5; .5 MG/3ML; MG/3ML
3 SOLUTION RESPIRATORY (INHALATION) ONCE
Status: COMPLETED | OUTPATIENT
Start: 2022-12-07 | End: 2022-12-07

## 2022-12-07 RX ORDER — IPRATROPIUM BROMIDE AND ALBUTEROL SULFATE 2.5; .5 MG/3ML; MG/3ML
3 SOLUTION RESPIRATORY (INHALATION) EVERY 6 HOURS PRN
Status: DISCONTINUED | OUTPATIENT
Start: 2022-12-07 | End: 2022-12-09

## 2022-12-07 RX ORDER — METHYLPREDNISOLONE SODIUM SUCCINATE 125 MG/2ML
125 INJECTION, POWDER, LYOPHILIZED, FOR SOLUTION INTRAMUSCULAR; INTRAVENOUS ONCE
Status: COMPLETED | OUTPATIENT
Start: 2022-12-07 | End: 2022-12-07

## 2022-12-07 RX ORDER — SODIUM CHLORIDE 9 MG/ML
INJECTION, SOLUTION INTRAVENOUS CONTINUOUS
Status: ACTIVE | OUTPATIENT
Start: 2022-12-07 | End: 2022-12-07

## 2022-12-07 RX ORDER — HEPARIN SODIUM AND DEXTROSE 10000; 5 [USP'U]/100ML; G/100ML
INJECTION INTRAVENOUS CONTINUOUS
Status: DISCONTINUED | OUTPATIENT
Start: 2022-12-08 | End: 2022-12-08

## 2022-12-07 RX ORDER — ONDANSETRON 2 MG/ML
4 INJECTION INTRAMUSCULAR; INTRAVENOUS EVERY 6 HOURS PRN
Status: DISCONTINUED | OUTPATIENT
Start: 2022-12-07 | End: 2022-12-09

## 2022-12-07 RX ORDER — DOXEPIN HYDROCHLORIDE 50 MG/1
1 CAPSULE ORAL DAILY
COMMUNITY
End: 2022-12-09

## 2022-12-07 RX ORDER — HEPARIN SODIUM AND DEXTROSE 10000; 5 [USP'U]/100ML; G/100ML
18 INJECTION INTRAVENOUS ONCE
Status: DISCONTINUED | OUTPATIENT
Start: 2022-12-07 | End: 2022-12-07

## 2022-12-07 RX ORDER — HEPARIN SODIUM 1000 [USP'U]/ML
80 INJECTION, SOLUTION INTRAVENOUS; SUBCUTANEOUS ONCE
Status: COMPLETED | OUTPATIENT
Start: 2022-12-07 | End: 2022-12-07

## 2022-12-07 RX ORDER — METHYLPREDNISOLONE SODIUM SUCCINATE 125 MG/2ML
80 INJECTION, POWDER, LYOPHILIZED, FOR SOLUTION INTRAMUSCULAR; INTRAVENOUS EVERY 8 HOURS
Status: DISCONTINUED | OUTPATIENT
Start: 2022-12-08 | End: 2022-12-09

## 2022-12-07 RX ORDER — ACETAMINOPHEN 325 MG/1
650 TABLET ORAL EVERY 6 HOURS PRN
Status: DISCONTINUED | OUTPATIENT
Start: 2022-12-07 | End: 2022-12-09

## 2022-12-07 NOTE — ED QUICK NOTES
Orders for admission, patient is aware of plan and ready to go upstairs. Any questions, please call ED RN Chuck Lucero  at extension [phone]. Vaccinated YES  Type of COVID test sent:Rapid  COVID Suspicion level: Low      Titratable drug(s) infusing:  Rate:N/A    LOC at time of transport:A0X4    Other pertinent information: Pt with o2 sat decrease. Pt places on NRB and then placed on high flow. Pt still with occasional desats. Pt received SoluMedrol IV, Duoneb and will be placed on heated high flow once on floor.     CIWA score=N/A  NIH score=N/A

## 2022-12-07 NOTE — ED INITIAL ASSESSMENT (HPI)
Pt to er with adult son from home  Reports increase in fatigue and diff breathing since last admission.

## 2022-12-07 NOTE — ED QUICK NOTES
Orders for admission, patient is aware of plan and ready to go upstairs. Any questions, please call ED RN Elena Cabrera  at extension [phone].      Patient Covid vaccination status: Fully vaccinated     COVID Test Ordered in ED: Rapid SARS-CoV-2 by PCR    COVID Suspicion at Admission: No risk with negative rapid    Running Infusions:  NS BOLUS  SEE MAR  See ermd notes on heparin for left arm/dvt=discontinued for poss procedure today per consult  Mental Status/LOC at time of transport: A/O X3    Other pertinent information: LIVES AT [address] [place] [address] score: N/A   NIH score:  N/A

## 2022-12-08 LAB
ANION GAP SERPL CALC-SCNC: 9 MMOL/L (ref 0–18)
BASOPHILS # BLD AUTO: 0.02 X10(3) UL (ref 0–0.2)
BASOPHILS NFR BLD AUTO: 0.2 %
BUN BLD-MCNC: 21 MG/DL (ref 7–18)
CALCIUM BLD-MCNC: 9 MG/DL (ref 8.5–10.1)
CHLORIDE SERPL-SCNC: 105 MMOL/L (ref 98–112)
CO2 SERPL-SCNC: 24 MMOL/L (ref 21–32)
CREAT BLD-MCNC: 0.77 MG/DL
EOSINOPHIL # BLD AUTO: 0 X10(3) UL (ref 0–0.7)
EOSINOPHIL NFR BLD AUTO: 0 %
ERYTHROCYTE [DISTWIDTH] IN BLOOD BY AUTOMATED COUNT: 14.6 %
GFR SERPLBLD BASED ON 1.73 SQ M-ARVRAT: 78 ML/MIN/1.73M2 (ref 60–?)
GLUCOSE BLD-MCNC: 177 MG/DL (ref 70–99)
HCT VFR BLD AUTO: 43.6 %
HGB BLD-MCNC: 14.5 G/DL
IMM GRANULOCYTES # BLD AUTO: 0.09 X10(3) UL (ref 0–1)
IMM GRANULOCYTES NFR BLD: 0.9 %
LYMPHOCYTES # BLD AUTO: 0.46 X10(3) UL (ref 1–4)
LYMPHOCYTES NFR BLD AUTO: 4.5 %
MCH RBC QN AUTO: 28.2 PG (ref 26–34)
MCHC RBC AUTO-ENTMCNC: 33.3 G/DL (ref 31–37)
MCV RBC AUTO: 84.7 FL
MONOCYTES # BLD AUTO: 0.08 X10(3) UL (ref 0.1–1)
MONOCYTES NFR BLD AUTO: 0.8 %
NEUTROPHILS # BLD AUTO: 9.66 X10 (3) UL (ref 1.5–7.7)
NEUTROPHILS # BLD AUTO: 9.66 X10(3) UL (ref 1.5–7.7)
NEUTROPHILS NFR BLD AUTO: 93.6 %
OSMOLALITY SERPL CALC.SUM OF ELEC: 293 MOSM/KG (ref 275–295)
PLATELET # BLD AUTO: 288 10(3)UL (ref 150–450)
POTASSIUM SERPL-SCNC: 3.6 MMOL/L (ref 3.5–5.1)
RBC # BLD AUTO: 5.15 X10(6)UL
SODIUM SERPL-SCNC: 138 MMOL/L (ref 136–145)
WBC # BLD AUTO: 10.3 X10(3) UL (ref 4–11)

## 2022-12-08 PROCEDURE — 80048 BASIC METABOLIC PNL TOTAL CA: CPT | Performed by: INTERNAL MEDICINE

## 2022-12-08 PROCEDURE — 85025 COMPLETE CBC W/AUTO DIFF WBC: CPT | Performed by: INTERNAL MEDICINE

## 2022-12-08 RX ORDER — SODIUM CHLORIDE 9 MG/ML
INJECTION, SOLUTION INTRAVENOUS CONTINUOUS
Status: DISCONTINUED | OUTPATIENT
Start: 2022-12-08 | End: 2022-12-09

## 2022-12-08 RX ORDER — RUFINAMIDE 40 MG/ML
1 SUSPENSION ORAL DAILY
Status: DISCONTINUED | OUTPATIENT
Start: 2022-12-09 | End: 2022-12-09

## 2022-12-08 NOTE — PLAN OF CARE
12/8 a/ox4, denies any pain at this time,noted sob with exertion. On 15L/nc,per night report, pt. Refusing heparin drip,verbalized desire to be hospice,order for hospice consult done. pt. Agreed with insertion of pleurx. OR scheduled it for tomorrow, unable to do it today, md notified, son here at bedside. Hospice here, able to talk to son and pt., agreed to have hospice at home after pleurx insertion in am. On zosyn iv, ivf started 0.9 at 50ml /hr, bedrest. Ronnie , Call light within reach.     Problem: Patient/Family Goals  Goal: Patient/Family Long Term Goal  Description: Patient's Long Term Goal: to go home with proper resources    Interventions:  - hospice n consult  - See additional Care Plan goals for specific interventions  Outcome: Progressing  Goal: Patient/Family Short Term Goal  Description: Patient's Short Term Goal: to be able to breath better or cmfortably    Interventions:   - oxygen at 15L/nc., scheduled for pleurx insertion in OR IN AM  - See additional Care Plan goals for specific interventions  Outcome: Progressing

## 2022-12-08 NOTE — PROGRESS NOTES
12/08/22 1504   Clinical Encounter Type   Visited With Patient   Patient Spiritual Encounters   Spiritual Assessment Completed Yes   Taxonomy   Intended Effects Build relationship of care and support   Methods Offer support   Interventions Acknowledge current situation; Active listening; Ask guided questions; Explain  role;Assist someone with Advance Directives      responded to request for POA. No poa documents in electronic or physical chart. Patient alert sitting up in bed. Patient expressed understanding of important of POA but does not want visit at this time.  offered copy of POA form and gave brief explanation. Patient encouraged to let nurse know if she would like to complete form during hospital stay.  remains available for support. Spiritual Care support can be requested via an [facility] consult. KEVIN Taylor  Extension: [phone]

## 2022-12-08 NOTE — PLAN OF CARE
Patient alert and oriented X 4, denies pain, afebrile. Patient SpO2 90-94% on 15 L per NC, periods of sob noticed, encouraged bed rest. NSR on telemetry, on IV steroids. Patient with significant recent weigh loss and decreased apetitte, denies nausea. Patient with generalized weakness, Up with one assist but encouraged to stay in bed due to increased hypoxia with exertion. Patient expressed desire to meet with hospice and adamantly refused to have any procedures done to her.     Problem: Patient/Family Goals  Goal: Patient/Family Long Term Goal  Description: Patient's Long Term Goal: discharge home with family    Interventions:  - O2 per NC 15 L, Zocyn, prednisone  - See additional Care Plan goals for specific interventions  Outcome: Progressing  Goal: Patient/Family Short Term Goal  Description: Patient's Short Term Goal: hypoxia    Interventions:   - O2 per   - See additional Care Plan goals for specific interventions  Outcome: Progressing

## 2022-12-08 NOTE — PROGRESS NOTES
12/07/22 3393   Provider Notification   Reason for Communication Patient request   Provider Name Patti HuertaDO   Method of Communication Page   Response See orders   Notification Time [phone]       Patient adamant on continuing with hospice and does not want any procedures. Ok to take off NPO midnight? Per MD OK to dc and continue regular diet overnight.

## 2022-12-08 NOTE — HOSPICE RN NOTE
Екатерина Batres is A&O x4 and participated in listening to hospice info with her son Virgil Davies at her side. Hospice philosophy and benefit reviewed including levels of care, comfort focus and team approach. Autumn verbalized that her goal is for comfort. Pleurex placement was moved to tomorrow so timing for d/c is pending. Tentative plan is for d/c to her son's home tomorrow afternoon and Lorne and his mother are aware that d/c timing is dependant on IR schedule with HUMAIRA Morse indicating the IR timing would be made known in the AM.  Pt is on 15l HF O2 and denies SOB at present, does not appear to be in distress. Pleurex is anticipated to provide improved comfort. POC reviewed with Bernadette Nina and HUMAIRA Dick in addition to family.

## 2022-12-08 NOTE — CM/SW NOTE
Received referral from Dr Kunal Sandra for hospice, joselito herrera . Will call family to schedule appointment.     Family bedside , will be meeting within the hour

## 2022-12-08 NOTE — PROGRESS NOTES
12/07/22 2014   Provider Notification   Reason for Communication Review case   Provider Name Luciano Norton MD  (On call: Sejal)   Method of Communication Page   Response Waiting for response   Notification Time 2015       Per hospitalist heparin drip ordered at midnight for left basilic thrombosis. 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 Per Dr. Karma Cook note hold off on any anticoags due to possible thora or pleurex cath placement tomorrow. Do you still want heparin drip started at midnight? Per Pulm, put Heparin gtt on hold due to possible procedure tomorrow and dc PTT draw at 0026.

## 2022-12-08 NOTE — OCCUPATIONAL THERAPY NOTE
OT order received, chart reviewed. Pt currently not medically appropriate for mobility assessment due to increased respiratory demands, requesting hospice. Will dc from acute OT as not consistent with end of life care. Please reconsult if POC changes, pt is agreeable to OT and has potential to functionally improve.

## 2022-12-08 NOTE — PHYSICAL THERAPY NOTE
PT order received, chart reviewed. Pt currently not medically appropriate for mobility assessment due to increased respiratory demands, requesting hospice. Will dc from acute PT as not consistent with end of life care. Please reconsult if POC changes, pt is agreeable to PT and has potential to functionally improve.

## 2022-12-08 NOTE — CM/SW NOTE
MINO received order for hospice. Chart reviewed, Marge from [facility] aware of consult and plans to meet with pt and pt's family this morning. MINO will continue to follow. JOCELYN Main  Discharge Planner     Addendum: MINO spoke with Yenni Carter, [facility]. Yenni Carter stated pt is signing consents for hospice with a blank date, and will admit to hospice once pt discharges home. Yenni Carter stated pt will get Pleurx catheter placed today or tomorrow and will discharge home after. MINO received order to complete POLST form, Yenni Carter stated [facility] will complete POLST form with pt. SW will continue to remain available for any additional discharge needs.

## 2022-12-09 ENCOUNTER — APPOINTMENT (OUTPATIENT)
Dept: INTERVENTIONAL RADIOLOGY/VASCULAR | Facility: HOSPITAL | Age: 82
End: 2022-12-09
Attending: INTERNAL MEDICINE
Payer: MEDICARE

## 2022-12-09 VITALS
WEIGHT: 80 LBS | TEMPERATURE: 98 F | DIASTOLIC BLOOD PRESSURE: 74 MMHG | RESPIRATION RATE: 16 BRPM | HEART RATE: 91 BPM | HEIGHT: 64 IN | SYSTOLIC BLOOD PRESSURE: 156 MMHG | OXYGEN SATURATION: 92 % | BODY MASS INDEX: 13.66 KG/M2

## 2022-12-09 PROCEDURE — 0W9B30Z DRAINAGE OF LEFT PLEURAL CAVITY WITH DRAINAGE DEVICE, PERCUTANEOUS APPROACH: ICD-10-PCS | Performed by: RADIOLOGY

## 2022-12-09 PROCEDURE — 99152 MOD SED SAME PHYS/QHP 5/>YRS: CPT | Performed by: RADIOLOGY

## 2022-12-09 PROCEDURE — 32550 INSERT PLEURAL CATH: CPT | Performed by: RADIOLOGY

## 2022-12-09 PROCEDURE — 75989 ABSCESS DRAINAGE UNDER X-RAY: CPT | Performed by: RADIOLOGY

## 2022-12-09 RX ORDER — MIDAZOLAM HYDROCHLORIDE 1 MG/ML
INJECTION INTRAMUSCULAR; INTRAVENOUS
Status: COMPLETED
Start: 2022-12-09 | End: 2022-12-09

## 2022-12-09 RX ORDER — LIDOCAINE HYDROCHLORIDE 10 MG/ML
INJECTION, SOLUTION INFILTRATION; PERINEURAL
Status: COMPLETED
Start: 2022-12-09 | End: 2022-12-09

## 2022-12-09 NOTE — PLAN OF CARE
Problem: Patient/Family Goals  Goal: Patient/Family Long Term Goal  Description: Patient's Long Term Goal: discharge home with hospice with adequate resources    Interventions:  - IV abx, pleurx cath  - See additional Care Plan goals for specific interventions  Outcome: Progressing  Goal: Patient/Family Short Term Goal  Description: Patient's Short Term Goal:   12/8 NOC: sleep  12/9: pleuralx today, remain comfortable, wean O2 as tolerated    Interventions:   - cluster care  - See additional Care Plan goals for specific interventions  Outcome: Progressing     Problem: RESPIRATORY - ADULT  Goal: Achieves optimal ventilation and oxygenation  Description: INTERVENTIONS:  - Assess for changes in respiratory status  - Assess for changes in mentation and behavior  - Position to facilitate oxygenation and minimize respiratory effort  - Oxygen supplementation based on oxygen saturation or ABGs  - Provide Smoking Cessation handout, if applicable  - Encourage broncho-pulmonary hygiene including cough, deep breathe, Incentive Spirometry  - Assess the need for suctioning and perform as needed  - Assess and instruct to report SOB or any respiratory difficulty  - Respiratory Therapy support as indicated  - Manage/alleviate anxiety  - Monitor for signs/symptoms of CO2 retention  Outcome: Progressing

## 2022-12-09 NOTE — PROGRESS NOTES
12/09/22 0115   Provider Notification   Reason for Communication Change in status  (AMS)   Provider Name Other (comment)  (Cindy Wooten)   Method of Communication Page   Response No new orders   Notification Time 0120     Pt here with hypoxia, found to have PNA and Large L pleural effusion. Agreed to pleurx cath to be placed in AM, then discharge home with hospice. On 15L HFNC. Pt increasingly confused throughout night. Only alert to self, whereas she was A&Ox4 during day shift. 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 Any orders? 0120:  No new orders per Dr. Bertie Buerger

## 2022-12-09 NOTE — PROCEDURES
[facility]  Procedure Note    Koki Russell Patient Status:  Inpatient    1940 MRN [id number]   [facility] Candace Sales [facility]   Hosp Day # 2 PCP Jana Pinto DO     Procedure: L tunneled pleural cath placement    Pre-Procedure Diagnosis:  Malignant effusion    Post-Procedure Diagnosis: Same    Anesthesia:  Local and Sedation    Findings:  Tunneled pleural cath placed to L pleural space. 1.4L serous fluid obtained.     Specimens: None    Blood Loss:  Minimal    Tourniquet Time: None  Complications:  None  Drains:  As above    Secondary Diagnosis:  None    Hui Fink MD  2022

## 2022-12-09 NOTE — PROGRESS NOTES
NURSING DISCHARGE NOTE    Discharged Home via Ambulance. Accompanied by Support staff  Belongings Taken by patient/family. Discharge education reviewed. New prescriptions and disease information provided. New medication and follow ups discussed. All questions and concerns addressed, pt verbalized understanding. [facility] to transport to home with hospice. Discharge packet prepared and sent with patient. Leroy gimenez updated on POC. PIV and tele removed. Pt ready for discharge.

## 2022-12-09 NOTE — HOSPICE RN NOTE
[facility] nursing visit for patient to discharge home today. Ambulance set for 4 pm transfer today. [facility] admission nurse will be meeting patient and family at home. All discharge plans in place. Please call [facility] with any questions or concerns.     Derryl Schaumann RN, [facility] Liaison  [phone] after hours

## 2022-12-09 NOTE — DISCHARGE INSTRUCTIONS
When transitioning home with hospice or palliative services it is important to have someone to call. Please contact [facility] [phone] after discharge to assist with any questions or concerns. For left Pleur-x drain:  Recommend drainage of pleurX catheter three times a week with max drainage of 1L each time.

## 2022-12-09 NOTE — PROGRESS NOTES
Patient chart reviewed for discharge: Medication Reconciliation completed, Specialist/PCP follow up listed, and disease specific Instructions/Education included in After Visit Summary. Discharge RN notified patient's RN of AVS completion and verified all consultants have signed off. Patient's RN to notify DC RN if discharge status changes.         Pt had Pleur-x catheter placed in IR this am. Plans for DC to home w/ [facility] at 4pm.

## 2022-12-09 NOTE — PLAN OF CARE
Received pt A&Ox4. Confused during shift, see previous note. Video monitoring and bed alarm on. Removes nasal cannula periodically. , able to wean down to 13L. IV abx. IV solumedrol. No tele ordered. NPO at midnight for pleurx cath placement. Continuous IVF. Incontinent with purwick in place. Up with assistance. Denies pain. Plan of care continues, no further needs at this time.      Problem: Patient/Family Goals  Goal: Patient/Family Long Term Goal  Description: Patient's Long Term Goal: go home    Interventions:  - IV abx, pleurx cath  - See additional Care Plan goals for specific interventions  Outcome: Progressing  Goal: Patient/Family Short Term Goal  Description: Patient's Short Term Goal: 12/8 NOC: sleep    Interventions:   - cluster care  - See additional Care Plan goals for specific interventions  Outcome: Progressing     Problem: RESPIRATORY - ADULT  Goal: Achieves optimal ventilation and oxygenation  Description: INTERVENTIONS:  - Assess for changes in respiratory status  - Assess for changes in mentation and behavior  - Position to facilitate oxygenation and minimize respiratory effort  - Oxygen supplementation based on oxygen saturation or ABGs  - Provide Smoking Cessation handout, if applicable  - Encourage broncho-pulmonary hygiene including cough, deep breathe, Incentive Spirometry  - Assess the need for suctioning and perform as needed  - Assess and instruct to report SOB or any respiratory difficulty  - Respiratory Therapy support as indicated  - Manage/alleviate anxiety  - Monitor for signs/symptoms of CO2 retention  Outcome: Progressing

## 2022-12-09 NOTE — HOSPICE RN NOTE
Pt is tentatively set to discharge tomorrow, all discharge planning is done, meds and DME to be delivered tomorrow. Plan is for pt to discharge home at [place] [address] via [employer], PCS form is completed in epic. Called and updated RN.

## (undated) NOTE — IP AVS SNAPSHOT
[address]            (For Outpatient Use Only) Initial Admit Date: 2022   Inpt/Obs Admit Date: Inpt: 22 / Obs: N/A   Discharge Date:    Hospital Acct:  [de-identified]   MRN: [de-identified]   CSN: [id number]   CEID: [id number]  Patient Class: Inpatient Admitting Provider: Melissa Irvin DO Unit: [address] Service: Oncology Attending Provider: Melissa Irvin DO   Bed: 504-A   Visit Type:   Referring Physician: No ref. provider found Billing Flag:    Admit Diagnosis: Hypoxia [R09.02]      PATIENT  Legal Name:   Bereket Pablo   Legal Sex: Female  Gender ID:              [address] [facility] Name:    PCP:  Quique Alatorre DO Home: [phone]   Address:    [address] : 1940 (82 yrs) Mobile: [phone]         City/[facility]/Zip: [email or web address] [address] Marital:  Language: [address] [place] [address] Drive: [facility] SSN4: [id number] Hoahaoism: Wishes Privacy     Race: White Ethnicity: Non  Or [address] [place] [address]   Name Relationship Legal Guardian? Home Phone Work Phone Mobile Phone   1.  BERENICE HOWE  2. *No Contact Specified* Leroy            [phone]       GUARANTOR  GuarantorWinferomaira Elizalde : 1940 Home Phone: [phone]   Address: [address] [place] [address]  Sex: Female Work Phone: [phone]   City/State/Zip: [email or web address] [address]   Rel. to Patient: Self Guarantor ID: [id number]   [address]   Employer:  Status: RETIRED     COVERAGE  PRIMARY INSURANCE   Payor: [address] [place] [address],[address]: [id number] [place] [address] Number: [id number] Insurance Type: [id number] Name: Gardenia Golden : 1940   Subscriber ID: [id number] Pt Rel to Subscriber: Self   SECONDARY INSURANCE   Payor:  Plan:    Group Number:  Insurance Type:    Subscriber Name:  Subscriber :    Subscriber ID:  Pt Rel to Subscriber:    TERTIARY INSURANCE   Payor:  Plan:    Group Number:  Insurance Type:    Subscriber Name:  Subscriber :    Subscriber ID: Pt Rel to Subscriber:    Hospital Account Financial Class: Medicare Advantage    December 9, 2022